# Patient Record
Sex: FEMALE | Race: WHITE | NOT HISPANIC OR LATINO
[De-identification: names, ages, dates, MRNs, and addresses within clinical notes are randomized per-mention and may not be internally consistent; named-entity substitution may affect disease eponyms.]

---

## 2021-05-12 ENCOUNTER — NON-APPOINTMENT (OUTPATIENT)
Age: 66
End: 2021-05-12

## 2021-05-24 PROBLEM — Z92.29 HISTORY OF TAMOXIFEN THERAPY: Status: ACTIVE | Noted: 2021-05-24

## 2021-05-24 PROBLEM — Z85.3 HISTORY OF BREAST CANCER: Status: RESOLVED | Noted: 2021-05-24 | Resolved: 2021-05-24

## 2021-05-24 RX ORDER — FLUTICASONE PROPIONATE 50 MCG
SPRAY, SUSPENSION NASAL
Refills: 0 | Status: ACTIVE | COMMUNITY

## 2021-05-24 RX ORDER — CLOBETASOL PROPIONATE 0.5 MG/G
OINTMENT TOPICAL
Refills: 0 | Status: ACTIVE | COMMUNITY

## 2021-05-26 ENCOUNTER — APPOINTMENT (OUTPATIENT)
Dept: GYNECOLOGIC ONCOLOGY | Facility: CLINIC | Age: 66
End: 2021-05-26
Payer: MEDICARE

## 2021-05-26 ENCOUNTER — NON-APPOINTMENT (OUTPATIENT)
Age: 66
End: 2021-05-26

## 2021-05-26 VITALS
WEIGHT: 136 LBS | DIASTOLIC BLOOD PRESSURE: 78 MMHG | SYSTOLIC BLOOD PRESSURE: 123 MMHG | BODY MASS INDEX: 22.66 KG/M2 | HEART RATE: 75 BPM | HEIGHT: 65 IN

## 2021-05-26 DIAGNOSIS — R93.89 ABNORMAL FINDINGS ON DIAGNOSTIC IMAGING OF OTHER SPECIFIED BODY STRUCTURES: ICD-10-CM

## 2021-05-26 DIAGNOSIS — Z85.3 PERSONAL HISTORY OF MALIGNANT NEOPLASM OF BREAST: ICD-10-CM

## 2021-05-26 DIAGNOSIS — Z92.29 PERSONAL HISTORY OF OTHER DRUG THERAPY: ICD-10-CM

## 2021-05-26 PROCEDURE — 99203 OFFICE O/P NEW LOW 30 MIN: CPT

## 2021-05-26 NOTE — HISTORY OF PRESENT ILLNESS
[FreeTextEntry1] : Referring GYN - Dr. Steven Valera\par PCP - Dr. Uzair Atkins\par GI - Dr. Khan\par Med Onc - Dr. Polo Ashton ([facility] in [place])\par \par Ms. Mackenzie is a 67 yo G0 postmenopausal female who presents for initial consultation at the request of Dr. Valera for the management of cystic endometrium in the setting of prior Tamoxifen therapy. She reports a history of left ER+ breast cancer in 1999 s/p lumpectomy, chemo/RT, followed by Tamoxifen until 2009. Annual GYN exam was performed 4/2021 and in office TVUS done revealing cystic endometrium. She underwent a D&C 4/8/21 for which was unsuccessful d/t inability to enter uterine cavity. She was referred here for further management. \par \par Denies postmenopausal bleeding, vaginal discharge or pelvic pain. Tolerating PO without N/V. Denies a change in appetite or weight. Report normal bowel and urinary function. No other associated signed or symptoms.\par \par PATHOLOGY:\par 1) Scope BSO, removal of pelvic cyst, pelvic washings, 10/11/13, Dr. Valera\par     a) simple serous cyst\par 2) D&C hysteroscopy, 4/8/21, Dr. Valera\par     a) false passage into the uterus, cavity could not be entered \par \par IMAGING:\par In office TVUS: uterus 3.20 x 3.43 x 1.99cm. EML 4mm with cystic changes, no noted vascularity. Left lateral IM fibroid 0.59 x 0.53 x 0.62cm. s/p bilateral oophorectomy. No FF. \par \par PMHx: Left breast cancer age 40 s/p chemo/RT and Tamoxifen\par PSHx: Lap BSO 2013\par Fam Hx: N/A\par \par Genetics:\par Patient reports BRCA 1/2 negative, 2017\par \par \par HCM:\par Mammogram: 8/2020: BIRADS 2\par Colonoscopy: 2018, polyps\par BD: 2021 - on Reclast yearly\par PAP on 1/27/21: negative\par \par

## 2021-05-26 NOTE — PHYSICAL EXAM
[Absent] : Adnexa(ae): Absent [Normal] : Recto-Vaginal Exam: Normal [Fully active, able to carry on all pre-disease performance without restriction] : Status 0 - Fully active, able to carry on all pre-disease performance without restriction [de-identified] : laparoscopic scars

## 2021-05-26 NOTE — DISCUSSION/SUMMARY
[Reviewed Clinical Lab Test(s)] : Results of clinical tests were reviewed. [Reviewed Radiology Report(s)] : Radiology reports were reviewed. [Discuss Alternatives/Risks/Benefits w/Patient] : All alternatives, risks, and benefits were discussed with the patient/family and all questions were answered.  Patient expressed good understanding and appreciates the importance of follow up as recommended. [FreeTextEntry1] : I sat down with Tana and her , Xiang, and reviewed the available radiographic and clinical findings. \par I explained that US evaluation of the endometrium is intended for evaluation of patients experiencing bleeding after menopause. In a patient with PMB and a endometrium measuring >4 mm, sampling is indicated to evaluate for the presence of malignancy. \par US measurement of endometrial thickness is less predictive of endometrial neoplasia in asymptomatic patients. \par A well-designed decision analysis calculated that postmenopausal patients without uterine bleeding who had an endometrial thickness >11 mm had an endometrial cancer risk of 6.7 percent, which is similar to that in postmenopausal patients with bleeding and endometrial thickness >5 mm. \par Sampling of the endometrium may be indicated for asymptomatic patient with lesser degrees of thickening but who have fluid identified in the endometrial cavity. \par I have recommended that Tana have a formal pelvic US with [facility] to better define her uterine anatomy. No fluid identified in the endometrial cavity on office US.  \par I do not see an indication to pursue EM sampling further at this time. \par All questions answered.

## 2021-08-14 ENCOUNTER — RESULT REVIEW (OUTPATIENT)
Age: 66
End: 2021-08-14

## 2021-08-14 ENCOUNTER — APPOINTMENT (OUTPATIENT)
Dept: ULTRASOUND IMAGING | Facility: IMAGING CENTER | Age: 66
End: 2021-08-14
Payer: MEDICARE

## 2021-08-14 ENCOUNTER — OUTPATIENT (OUTPATIENT)
Dept: OUTPATIENT SERVICES | Facility: HOSPITAL | Age: 66
LOS: 1 days | End: 2021-08-14
Payer: MEDICARE

## 2021-08-14 DIAGNOSIS — R93.89 ABNORMAL FINDINGS ON DIAGNOSTIC IMAGING OF OTHER SPECIFIED BODY STRUCTURES: ICD-10-CM

## 2021-08-14 PROCEDURE — 76830 TRANSVAGINAL US NON-OB: CPT

## 2021-08-14 PROCEDURE — 76856 US EXAM PELVIC COMPLETE: CPT

## 2021-08-14 PROCEDURE — 76830 TRANSVAGINAL US NON-OB: CPT | Mod: 26

## 2021-08-14 PROCEDURE — 76856 US EXAM PELVIC COMPLETE: CPT | Mod: 26

## 2021-08-16 ENCOUNTER — NON-APPOINTMENT (OUTPATIENT)
Age: 66
End: 2021-08-16

## 2023-10-04 ENCOUNTER — OUTPATIENT (OUTPATIENT)
Dept: OUTPATIENT SERVICES | Facility: HOSPITAL | Age: 68
LOS: 1 days | End: 2023-10-04
Payer: MEDICARE

## 2023-10-04 ENCOUNTER — APPOINTMENT (OUTPATIENT)
Dept: RADIOLOGY | Facility: IMAGING CENTER | Age: 68
End: 2023-10-04
Payer: MEDICARE

## 2023-10-04 DIAGNOSIS — Z00.8 ENCOUNTER FOR OTHER GENERAL EXAMINATION: ICD-10-CM

## 2023-10-04 DIAGNOSIS — M54.50 LOW BACK PAIN, UNSPECIFIED: ICD-10-CM

## 2023-10-04 PROCEDURE — 71046 X-RAY EXAM CHEST 2 VIEWS: CPT

## 2023-10-04 PROCEDURE — 71046 X-RAY EXAM CHEST 2 VIEWS: CPT | Mod: 26

## 2023-10-06 ENCOUNTER — TRANSCRIPTION ENCOUNTER (OUTPATIENT)
Age: 68
End: 2023-10-06

## 2024-07-23 ENCOUNTER — NON-APPOINTMENT (OUTPATIENT)
Age: 69
End: 2024-07-23

## 2024-08-14 ENCOUNTER — APPOINTMENT (OUTPATIENT)
Dept: GYNECOLOGIC ONCOLOGY | Facility: CLINIC | Age: 69
End: 2024-08-14
Payer: MEDICARE

## 2024-08-14 DIAGNOSIS — Z80.8 FAMILY HISTORY OF MALIGNANT NEOPLASM OF OTHER ORGANS OR SYSTEMS: ICD-10-CM

## 2024-08-14 DIAGNOSIS — R93.89 ABNORMAL FINDINGS ON DIAGNOSTIC IMAGING OF OTHER SPECIFIED BODY STRUCTURES: ICD-10-CM

## 2024-08-14 PROCEDURE — 99459 PELVIC EXAMINATION: CPT

## 2024-08-14 PROCEDURE — 99203 OFFICE O/P NEW LOW 30 MIN: CPT

## 2024-08-14 RX ORDER — CALCIUM CARBONATE/VITAMIN D3 600 MG-10
TABLET ORAL
Refills: 0 | Status: ACTIVE | COMMUNITY

## 2024-08-14 RX ORDER — ATORVASTATIN CALCIUM 10 MG/1
10 TABLET, FILM COATED ORAL
Refills: 0 | Status: ACTIVE | COMMUNITY

## 2024-08-14 RX ORDER — DENOSUMAB 60 MG/ML
60 INJECTION SUBCUTANEOUS
Refills: 0 | Status: ACTIVE | COMMUNITY

## 2024-08-14 RX ORDER — MAGNESIUM OXIDE/MAG AA CHELATE 300 MG
CAPSULE ORAL
Refills: 0 | Status: ACTIVE | COMMUNITY

## 2024-08-14 NOTE — PHYSICAL EXAM
[Chaperone Present] : A chaperone was present in the examining room during all aspects of the physical examination [64135] : A chaperone was present during the pelvic exam. [Normal] : Anus and perineum: Normal sphincter tone, no masses, no prolapse. [Fully active, able to carry on all pre-disease performance without restriction] : Status 0 - Fully active, able to carry on all pre-disease performance without restriction

## 2024-08-14 NOTE — HISTORY OF PRESENT ILLNESS
[FreeTextEntry1] : Referred by/ GYN Dr. Gretel Resendez PCP Dr. Uzair Atkins   Ms. Tana Mackenzie is a 69-year-old nulliparous female, referred by her GYN, Dr. Gretel Resendez, for further evaluation and management of septated adnexal fluid collection, and persistent cystic appearance of the endometrium.  within normal limits.   She was last seen by me in 2021 for thickened cystic appearing endometrium without PMB. She did undergo an in-office EMBX and D&C hysteroscopy with her GYN, Dr. FRANCISCO Valera. However, procedure was aborted due to stenotic cervical os.   On comparison of her in-office TVUS with her GYN in Jan and July, the fluid collection has decreased in size.   Dr. Resendez recommended continued surveillance at this time. If she begins to experience pain, increase in the size of pelvic cyst, or has complaints of PMB. She advised to proceed with EMB, D&C hysteroscopy. If workup was inconclusive or unable to be performed, she recommends definitive management with hysterectomy.  She has a personal history of left ER+ breast cancer in 1999. Status post lumpectomy, lymph node dissection, chemotRT, tamoxifen use until 2009. Self-reports BRCA 1/2 negative.   She is status post laparoscopic BSO in 2013 with Dr. Valera for pelvic cyst, final pathology was benign.   2 failed IVF attempts.  She denies PMB, vaginal discharge, pelvic pain, early satiety, changes in bowel and urinary habits, abdominal distention, bloating. She is here to discuss further management.   7/11/2024 Tumor Markers    4.7   7/11/2024 TVUS (in-office)  - UTERUS 3 x 2.3 x 2.2 cm, fibroid measuring 0.6 x 0.6 x 0.6 cm  - ENDOMETRIUM 4.1 mm, cystic appearance - RTO, LTO surgically absent   - Free fluid in the cul de sac. Septated fluid left adnexa measuring 3.8 x 2.4 x 2 cm  6/24/2024 TVUS (in-office)  - UTERUS retroverted, intramural myoma seen  - ENDOMETRIUM 5.1 mm cystic changes noted - RTO, LTO surgically absent - Free fluid in the cul de sac, septated fluid collection vs right adnexal septated cyst measuring 7.3 x 3.7 x 3.4 cm   1/30/2024 TVUS (in-office)  - UTERUS 3.9 x 2.7 x 2.2 cm, fibroid measuring 0.7 x 0.6 x 0.7 cm - ENDOMETRIUM 5.6 mm, cystic changes noted - RTO, LTO surgically absent  - Moderate amount of free fluid seen in the cul de sac  1/27/2023 TVUS (in-office)  - UTERUS retroverted, posterior myoma seen - ENDOMETRIUM 1.7 mm  - RTO, LTO surgically absent - Moderate free fluid in the cul de sac   8/4/2021 TVUS ([facility])  - UTERUS 4.5 x 2.9 x 2.0 cm. There is 0.6 cm posterior intramural myoma. - ENDOMETRIUM1 mm. There is focal thickening in the fundal portion of the endometrial canal measuring 4 mm with associated tiny cystic changes. - RTO, LTO surgically absent. No large adnexal masses. - Trace amount of free fluid in the pelvis.  PMH: Breast cancer s/p chemoRT, osteopenia  PSH: D&C, myomectomy, laparoscopic BSO, CHAZ, lumpectomy, varicose vein stripping  Family history cancer: denies   Pap 1/30/2024 NILM, HrHPV -  Mammo Colonoscopy

## 2024-08-14 NOTE — CHIEF COMPLAINT
[FreeTextEntry1] : Septated adnexal fluid collection Persistent cystic appearance of endometrium Last seen 2021

## 2024-08-14 NOTE — ASSESSMENT
[FreeTextEntry1] : Stable scant fluid in the endometrial cavity.  No PMB.  Likely peritoneal inclusion cyst.

## 2024-08-14 NOTE — HISTORY OF PRESENT ILLNESS
[FreeTextEntry1] : Referred by/ GYN Dr. Gretel Resendez PCP Dr. Uzair Atkins   Ms. Tana Mackenzie is a 69-year-old nulliparous female, referred by her GYN, Dr. Gretel eRsendez, for further evaluation and management of septated adnexal fluid collection, and persistent cystic appearance of the endometrium.  within normal limits.   She was last seen by me in 2021 for thickened cystic appearing endometrium without PMB. She did undergo an in-office EMBX and D&C hysteroscopy with her GYN, Dr. FRANCISCO Valera. However, procedure was aborted due to stenotic cervical os.   On comparison of her in-office TVUS with her GYN in Jan and July, the fluid collection has decreased in size.   Dr. Resendez recommended continued surveillance at this time. If she begins to experience pain, increase in the size of pelvic cyst, or has complaints of PMB. She advised to proceed with EMB, D&C hysteroscopy. If workup was inconclusive or unable to be performed, she recommends definitive management with hysterectomy.  She has a personal history of left ER+ breast cancer in 1999. Status post lumpectomy, lymph node dissection, chemotRT, tamoxifen use until 2009. Self-reports BRCA 1/2 negative.   She is status post laparoscopic BSO in 2013 with Dr. Valera for pelvic cyst, final pathology was benign.   2 failed IVF attempts.  She denies PMB, vaginal discharge, pelvic pain, early satiety, changes in bowel and urinary habits, abdominal distention, bloating. She is here to discuss further management.   7/11/2024 Tumor Markers    4.7   7/11/2024 TVUS (in-office)  - UTERUS 3 x 2.3 x 2.2 cm, fibroid measuring 0.6 x 0.6 x 0.6 cm  - ENDOMETRIUM 4.1 mm, cystic appearance - RTO, LTO surgically absent   - Free fluid in the cul de sac. Septated fluid left adnexa measuring 3.8 x 2.4 x 2 cm  6/24/2024 TVUS (in-office)  - UTERUS retroverted, intramural myoma seen  - ENDOMETRIUM 5.1 mm cystic changes noted - RTO, LTO surgically absent - Free fluid in the cul de sac, septated fluid collection vs right adnexal septated cyst measuring 7.3 x 3.7 x 3.4 cm   1/30/2024 TVUS (in-office)  - UTERUS 3.9 x 2.7 x 2.2 cm, fibroid measuring 0.7 x 0.6 x 0.7 cm - ENDOMETRIUM 5.6 mm, cystic changes noted - RTO, LTO surgically absent  - Moderate amount of free fluid seen in the cul de sac  1/27/2023 TVUS (in-office)  - UTERUS retroverted, posterior myoma seen - ENDOMETRIUM 1.7 mm  - RTO, LTO surgically absent - Moderate free fluid in the cul de sac   8/4/2021 TVUS ([facility])  - UTERUS 4.5 x 2.9 x 2.0 cm. There is 0.6 cm posterior intramural myoma. - ENDOMETRIUM1 mm. There is focal thickening in the fundal portion of the endometrial canal measuring 4 mm with associated tiny cystic changes. - RTO, LTO surgically absent. No large adnexal masses. - Trace amount of free fluid in the pelvis.  PMH: Breast cancer s/p chemoRT, osteopenia  PSH: D&C, myomectomy, laparoscopic BSO, CHAZ, lumpectomy, varicose vein stripping  Family history cancer: denies   Pap 1/30/2024 NILM, HrHPV -  Mammo Colonoscopy

## 2024-08-14 NOTE — PHYSICAL EXAM
[Chaperone Present] : A chaperone was present in the examining room during all aspects of the physical examination [99940] : A chaperone was present during the pelvic exam. [Normal] : Anus and perineum: Normal sphincter tone, no masses, no prolapse. [Fully active, able to carry on all pre-disease performance without restriction] : Status 0 - Fully active, able to carry on all pre-disease performance without restriction

## 2024-08-14 NOTE — DISCUSSION/SUMMARY
[Reviewed Clinical Lab Test(s)] : Results of clinical tests were reviewed. [Reviewed Radiology Report(s)] : Radiology reports were reviewed. [Reviewed Radiology Film/Image(s)] : Images from radiology studies were reviewed and examined. [Discuss Alternatives/Risks/Benefits w/Patient] : All alternatives, risks, and benefits were discussed with the patient/family and all questions were answered.  Patient expressed good understanding and appreciates the importance of follow up as recommended. [FreeTextEntry1] : I sat down with Tana and her .  We reviewed her recent and prior US studies.  She has undergone BSO. At that time she had evidence of a peritoneal inclusion cyst.  I suspect the finding of fluid in the cul de sac represents additional peritoneal inclusion cysts.  This does not require further evaluation.  She has had a small amount of fluid in the endometrium on and off for years.  US shows no significant lesion of the endometrium.  She has never had PMB.  Findings are essentially stable over time and are low risk for malignancy.  Continued observation seems reasonable to me.  All questions answered.

## 2025-05-08 ENCOUNTER — OUTPATIENT (OUTPATIENT)
Dept: OUTPATIENT SERVICES | Facility: HOSPITAL | Age: 70
LOS: 1 days | End: 2025-05-08
Payer: MEDICARE

## 2025-05-08 ENCOUNTER — APPOINTMENT (OUTPATIENT)
Dept: MRI IMAGING | Facility: IMAGING CENTER | Age: 70
End: 2025-05-08

## 2025-05-08 DIAGNOSIS — Z00.8 ENCOUNTER FOR OTHER GENERAL EXAMINATION: ICD-10-CM

## 2025-05-08 PROCEDURE — A9585: CPT

## 2025-05-08 PROCEDURE — 74183 MRI ABD W/O CNTR FLWD CNTR: CPT | Mod: 26

## 2025-05-08 PROCEDURE — 74183 MRI ABD W/O CNTR FLWD CNTR: CPT | Mod: MH
